# Patient Record
Sex: MALE | Race: AMERICAN INDIAN OR ALASKA NATIVE
[De-identification: names, ages, dates, MRNs, and addresses within clinical notes are randomized per-mention and may not be internally consistent; named-entity substitution may affect disease eponyms.]

---

## 2022-08-20 ENCOUNTER — HOSPITAL ENCOUNTER (EMERGENCY)
Dept: HOSPITAL 5 - ED | Age: 82
Discharge: HOME | End: 2022-08-20
Payer: COMMERCIAL

## 2022-08-20 VITALS — DIASTOLIC BLOOD PRESSURE: 44 MMHG | SYSTOLIC BLOOD PRESSURE: 125 MMHG

## 2022-08-20 DIAGNOSIS — E11.9: ICD-10-CM
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
## 2022-08-20 DIAGNOSIS — R10.9: ICD-10-CM

## 2022-08-20 DIAGNOSIS — R07.89: Primary | ICD-10-CM

## 2022-08-20 DIAGNOSIS — M54.9: ICD-10-CM
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
## 2022-08-20 DIAGNOSIS — I10: ICD-10-CM
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
## 2022-08-20 PROCEDURE — 96372 THER/PROPH/DIAG INJ SC/IM: CPT

## 2022-08-20 PROCEDURE — 72128 CT CHEST SPINE W/O DYE: CPT

## 2022-08-20 PROCEDURE — 71250 CT THORAX DX C-: CPT

## 2022-08-20 PROCEDURE — 99284 EMERGENCY DEPT VISIT MOD MDM: CPT

## 2022-08-20 PROCEDURE — 70450 CT HEAD/BRAIN W/O DYE: CPT

## 2022-08-20 PROCEDURE — 74176 CT ABD & PELVIS W/O CONTRAST: CPT
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
## 2023-06-01 ENCOUNTER — OFFICE VISIT (OUTPATIENT)
Dept: URBAN - METROPOLITAN AREA CLINIC 118 | Facility: CLINIC | Age: 83
End: 2023-06-01
Payer: COMMERCIAL

## 2023-06-01 ENCOUNTER — DASHBOARD ENCOUNTERS (OUTPATIENT)
Age: 83
End: 2023-06-01

## 2023-06-01 ENCOUNTER — LAB OUTSIDE AN ENCOUNTER (OUTPATIENT)
Dept: URBAN - METROPOLITAN AREA CLINIC 118 | Facility: CLINIC | Age: 83
End: 2023-06-01

## 2023-06-01 VITALS
SYSTOLIC BLOOD PRESSURE: 115 MMHG | WEIGHT: 224.6 LBS | HEIGHT: 69 IN | BODY MASS INDEX: 33.27 KG/M2 | DIASTOLIC BLOOD PRESSURE: 77 MMHG | TEMPERATURE: 97.2 F | HEART RATE: 51 BPM

## 2023-06-01 DIAGNOSIS — Z86.010 PERSONAL HISTORY OF COLONIC POLYPS: ICD-10-CM

## 2023-06-01 PROBLEM — 428283002: Status: ACTIVE | Noted: 2023-06-01

## 2023-06-01 PROCEDURE — 99203 OFFICE O/P NEW LOW 30 MIN: CPT | Performed by: INTERNAL MEDICINE

## 2023-06-01 RX ORDER — POLYETHYLENE GLYCOL 3350, SODIUM SULFATE ANHYDROUS, SODIUM BICARBONATE, SODIUM CHLORIDE, POTASSIUM CHLORIDE 236; 22.74; 6.74; 5.86; 2.97 G/4L; G/4L; G/4L; G/4L; G/4L
ML POWDER, FOR SOLUTION ORAL
Qty: 1 | Refills: 0 | OUTPATIENT
Start: 2023-06-01 | End: 2023-06-02

## 2023-06-01 NOTE — HPI-TODAY'S VISIT:
Mr. Meade is a 83 y/o male who presents today for colonoscopy consultation.  His last colonoscopy was 9/13/2012 which a 1.5cm polyp at the splenic flexure, path report not available but patient was recommended to repeat in 3 years due to the size of the polyp. States after that he had another colonoscopy ~5 years ago (cannot recall where). States they told him he is now due for his next colonoscopy.  He denies any GI bleeding, unintentional weight loss, NV, changes in bowel habits or abdominal pain. Has a BM QOD diet depending.

## 2023-06-16 ENCOUNTER — OFFICE VISIT (OUTPATIENT)
Dept: URBAN - METROPOLITAN AREA SURGERY CENTER 23 | Facility: SURGERY CENTER | Age: 83
End: 2023-06-16

## 2024-09-13 ENCOUNTER — OFFICE VISIT (OUTPATIENT)
Dept: URBAN - METROPOLITAN AREA CLINIC 118 | Facility: CLINIC | Age: 84
End: 2024-09-13

## 2025-06-09 ENCOUNTER — LAB OUTSIDE AN ENCOUNTER (OUTPATIENT)
Dept: URBAN - METROPOLITAN AREA CLINIC 118 | Facility: CLINIC | Age: 85
End: 2025-06-09

## 2025-06-09 ENCOUNTER — OFFICE VISIT (OUTPATIENT)
Dept: URBAN - METROPOLITAN AREA CLINIC 118 | Facility: CLINIC | Age: 85
End: 2025-06-09
Payer: COMMERCIAL

## 2025-06-09 DIAGNOSIS — Z86.0100 PERSONAL HISTORY OF COLONIC POLYPS: ICD-10-CM

## 2025-06-09 DIAGNOSIS — R74.8 ABNORMAL LIVER ENZYMES: ICD-10-CM

## 2025-06-09 PROCEDURE — 99244 OFF/OP CNSLTJ NEW/EST MOD 40: CPT | Performed by: INTERNAL MEDICINE

## 2025-06-09 RX ORDER — EZETIMIBE 10 MG/1
1 TABLET TABLET ORAL ONCE A DAY
Status: ACTIVE | COMMUNITY

## 2025-06-09 NOTE — HPI-TODAY'S VISIT:
The patient presents as referral from Dr Josselyn Sherman for evaluation of abnormal liver enzmes.  A copy will be sent to the referring provider.  Patient with elevated liver enzymes on routine labs a couple months ago.  ast/alt in the 100s range per pt.  we have records from last labs from a couple weeks ago when ast/alt decreased to ~1.5 x ULN.  patient denies abd pain, jaundice, alcohol intake or any obvious new meds.  has been on statin for years.  started taking milk thistle recently.  reports having colonoscopy 1.5 years ago with outside GI practice without abnormalities/polyps from pt's understanding.

## 2025-06-10 LAB
ALBUMIN/GLOBULIN RATIO: 1.8
ALBUMIN: 4.1
ALKALINE PHOSPHATASE: 81
ALT (SGPT): 66
AST (SGOT): 46
BILIRUBIN, DIRECT: 0.2
BILIRUBIN, INDIRECT: 0.6
BILIRUBIN, TOTAL: 0.8
GLOBULIN: 2.3
HEPATITIS B SURFACE ANTIGEN: (no result)
HEPATITIS C ANTIBODY: (no result)
PROTEIN, TOTAL: 6.4

## 2025-07-28 ENCOUNTER — OFFICE VISIT (OUTPATIENT)
Dept: URBAN - METROPOLITAN AREA CLINIC 117 | Facility: CLINIC | Age: 85
End: 2025-07-28
Payer: COMMERCIAL

## 2025-07-28 DIAGNOSIS — K76.0 FATTY LIVER: ICD-10-CM

## 2025-07-28 PROCEDURE — 76705 ECHO EXAM OF ABDOMEN: CPT | Performed by: INTERNAL MEDICINE

## 2025-07-28 RX ORDER — EZETIMIBE 10 MG/1
1 TABLET TABLET ORAL ONCE A DAY
Status: ACTIVE | COMMUNITY

## 2025-07-30 ENCOUNTER — OFFICE VISIT (OUTPATIENT)
Dept: URBAN - METROPOLITAN AREA CLINIC 109 | Facility: CLINIC | Age: 85
End: 2025-07-30
Payer: COMMERCIAL

## 2025-07-30 DIAGNOSIS — R74.8 ABNORMAL LIVER ENZYMES: ICD-10-CM

## 2025-07-30 DIAGNOSIS — Z86.0100 PERSONAL HISTORY OF COLONIC POLYPS: ICD-10-CM

## 2025-07-30 PROCEDURE — 99214 OFFICE O/P EST MOD 30 MIN: CPT | Performed by: INTERNAL MEDICINE

## 2025-07-30 RX ORDER — EZETIMIBE 10 MG/1
1 TABLET TABLET ORAL ONCE A DAY
Status: ACTIVE | COMMUNITY

## 2025-07-30 NOTE — HPI-TODAY'S VISIT:
The patient presents for follow-up appointment.  no new gi complaints/symptoms since last appt.  he is more concerned about his "bladder" after recently learning of Patric Bingham bladder CA diagnosis.  He denies abd pain, jaundice, n/v, weight loss, etc.  liver enzymes were improved from previous levels when checked last month.   June, 2025: The patient presents as referral from Dr Josselyn Sherman for evaluation of abnormal liver enzmes.  A copy will be sent to the referring provider.  Patient with elevated liver enzymes on routine labs a couple months ago.  ast/alt in the 100s range per pt.  we have records from last labs from a couple weeks ago when ast/alt decreased to ~1.5 x ULN.  patient denies abd pain, jaundice, alcohol intake or any obvious new meds.  has been on statin for years.  started taking milk thistle recently.  reports having colonoscopy 1.5 years ago with outside GI practice without abnormalities/polyps from pt's understanding.

## 2025-07-31 LAB
ALBUMIN/GLOBULIN RATIO: 2.2
ALBUMIN: 4.2
ALKALINE PHOSPHATASE: 64
ALT (SGPT): 25
AST (SGOT): 25
BILIRUBIN, DIRECT: 0.2
BILIRUBIN, INDIRECT: 0.4
BILIRUBIN, TOTAL: 0.6
GLOBULIN: 1.9
PROTEIN, TOTAL: 6.1